# Patient Record
Sex: FEMALE | Race: ASIAN
[De-identification: names, ages, dates, MRNs, and addresses within clinical notes are randomized per-mention and may not be internally consistent; named-entity substitution may affect disease eponyms.]

---

## 2018-07-11 ENCOUNTER — HOSPITAL ENCOUNTER (OUTPATIENT)
Dept: HOSPITAL 68 - STS | Age: 33
End: 2018-07-11
Attending: SURGERY
Payer: COMMERCIAL

## 2018-07-11 VITALS — BODY MASS INDEX: 26.06 KG/M2 | WEIGHT: 138 LBS | HEIGHT: 61 IN

## 2018-07-11 DIAGNOSIS — D17.1: Primary | ICD-10-CM

## 2018-07-11 NOTE — OPERATIVE REPORT
Operative/Inv Procedure Report
Surgery Date: 07/11/18
Name of Procedure:
Excision ~15 cm upper back lipoma
Pre-Operative Diagnosis:
Right upper back lipoma
Post-Operative Diagnosis:
Same
Estimated Blood Loss: less than 50ml
Surgeon/Assistant:
Cody Zaman DO
Anesthesia: laryngeal mask airway
IV Fluids:
500 cc
Drains:
None
Specimens:
~15 cm lipoma
Complications:
none
Condition:
Stable
Operative Indication:
This is a 33-year-old female who presented to the office with a right upper back
mass.  Patient states the mass has been there for a very long time but has been 
getting bigger recently and getting more uncomfortable.  On exam patient was 
found to have a large, soft, and mobile subcutaneous mass.  This was consistent 
with a lipoma.  An excision of this lipoma was discussed in detail.  All risks 
including but not limited to bleeding, infection, seroma formation, and 
recurrence were discussed in detail.  The patient understood everything and 
decided to proceed.
 
Operative/Procedure Note
Note:
The patient was brought to the operating room and placed on the table in supine 
position.  Venodyne stockings were placed in adequate general anesthesia with 
LMA was achieved.  Patient was placed in left lateral decubitus position with 
the right shoulder up in the air.  The right upper back was prepped and draped 
in standard surgical fashion.  Began the procedure by making approximately 10-15
cm incision over the large palpable lipoma.  Incision was carried through the 
subcutaneous tissue using Bovie electrocautery maintaining hemostasis until the 
lipoma was encountered.  The lipoma was circumferentially dissected away from 
surrounding structures using blunt dissection and electrocautery for hemostasis.
 Lipoma was removed in its entirety and handed off the field.  Size of the 
lipoma was approximately 15 cm diameter.  Cavity was examined and no further 
evidence of lipoma was noted.  It was irrigated until clear and all hemostasis 
was achieved.  Subcutaneous tissue was then closed using 3-0 Vicryl suture.  
Skin was closed using 4-0 Monocryl.  All Steri-Strips and dressings were placed.
 The patient was successfully extubated and transferred to the recovery room in 
stable condition.  The patient tolerated procedure well no complications.
Findings:
~15 cm upper back lipoma, subcutaneous, removed in entirety